# Patient Record
Sex: MALE | Race: WHITE | NOT HISPANIC OR LATINO | Employment: FULL TIME | URBAN - METROPOLITAN AREA
[De-identification: names, ages, dates, MRNs, and addresses within clinical notes are randomized per-mention and may not be internally consistent; named-entity substitution may affect disease eponyms.]

---

## 2023-06-04 ENCOUNTER — OFFICE VISIT (OUTPATIENT)
Dept: URGENT CARE | Facility: URGENT CARE | Age: 38
End: 2023-06-04
Payer: COMMERCIAL

## 2023-06-04 VITALS
RESPIRATION RATE: 18 BRPM | WEIGHT: 191 LBS | DIASTOLIC BLOOD PRESSURE: 70 MMHG | SYSTOLIC BLOOD PRESSURE: 117 MMHG | TEMPERATURE: 97.5 F | HEART RATE: 92 BPM | OXYGEN SATURATION: 96 %

## 2023-06-04 DIAGNOSIS — R07.0 THROAT PAIN: Primary | ICD-10-CM

## 2023-06-04 LAB
DEPRECATED S PYO AG THROAT QL EIA: NEGATIVE
GROUP A STREP BY PCR: NOT DETECTED

## 2023-06-04 PROCEDURE — 87651 STREP A DNA AMP PROBE: CPT

## 2023-06-04 PROCEDURE — 99203 OFFICE O/P NEW LOW 30 MIN: CPT

## 2023-06-04 RX ORDER — CARVEDILOL 3.12 MG/1
3.12 TABLET ORAL 2 TIMES DAILY WITH MEALS
COMMUNITY
Start: 2023-05-21

## 2023-06-04 RX ORDER — LIDOCAINE HYDROCHLORIDE 20 MG/ML
15 SOLUTION OROPHARYNGEAL EVERY 4 HOURS PRN
Qty: 100 ML | Refills: 0 | Status: SHIPPED | OUTPATIENT
Start: 2023-06-04 | End: 2023-06-11

## 2023-06-04 RX ORDER — ISOSORBIDE MONONITRATE 60 MG/1
1 TABLET, EXTENDED RELEASE ORAL
COMMUNITY
Start: 2022-12-22

## 2023-06-04 RX ORDER — INHALER,ASSIST DEV,SMALL MASK
SPACER (EA) MISCELLANEOUS
COMMUNITY
Start: 2023-04-27

## 2023-06-04 RX ORDER — PRASUGREL 10 MG/1
10 TABLET, FILM COATED ORAL EVERY MORNING
COMMUNITY
Start: 2023-05-28

## 2023-06-04 RX ORDER — CETIRIZINE HYDROCHLORIDE 10 MG/1
1 TABLET ORAL
COMMUNITY
Start: 2023-05-18

## 2023-06-04 RX ORDER — CEFUROXIME AXETIL 500 MG/1
1 TABLET ORAL
COMMUNITY
Start: 2023-05-24

## 2023-06-04 RX ORDER — OLMESARTAN MEDOXOMIL 20 MG/1
1 TABLET ORAL
COMMUNITY
Start: 2023-05-21

## 2023-06-04 RX ORDER — FLUTICASONE PROPIONATE 50 MCG
SPRAY, SUSPENSION (ML) NASAL
COMMUNITY
Start: 2022-10-03

## 2023-06-04 RX ORDER — PANTOPRAZOLE SODIUM 40 MG/1
TABLET, DELAYED RELEASE ORAL
COMMUNITY
Start: 2023-05-30

## 2023-06-04 RX ORDER — DILTIAZEM HYDROCHLORIDE 60 MG/1
TABLET, FILM COATED ORAL
COMMUNITY
Start: 2023-04-27

## 2023-06-04 RX ORDER — RANOLAZINE 1000 MG/1
1 TABLET, EXTENDED RELEASE ORAL
COMMUNITY
Start: 2023-05-17

## 2023-06-04 RX ORDER — ROSUVASTATIN CALCIUM 40 MG/1
40 TABLET, COATED ORAL EVERY EVENING
COMMUNITY
Start: 2023-05-21

## 2023-06-04 NOTE — PATIENT INSTRUCTIONS
Strep test is negative for strep throat.  Get plenty of rest and drink fluids.  Can use Tylenol as needed for pain.  Maximum dose of Tylenol is 4000mg in a 24 hour period of time.  You can also try warm salt water gargles, hot/warm water or tea with honey and/or lemon and/or Cepacol lozenges or spray for your sore throat.  You can also use the lidocaine solution as needed for the pain.

## 2023-06-04 NOTE — PROGRESS NOTES
ASSESSMENT:   (R07.0) Throat pain  (primary encounter diagnosis)  Plan: Streptococcus A Rapid Screen w/Reflex to PCR -         Clinic Collect, Group A Streptococcus PCR         Throat Swab, lidocaine, viscous, (XYLOCAINE) 2         % solution    PLAN:  Informed the patient of the strep test is negative for strep throat.  We discussed the need for him to get plenty of rest, drink fluids and use Tylenol as needed for pain with max dose being 4000 mg in 24 period time.  We also discussed trying warm salt gargles, hot/warm water or tea with honey under lemon and or Cepacol lozenges or spray for the sore throat.  As the patient indicated he has not had relief with Cepacol and gargling with warm salt water we discussed using lidocaine solution as well as needed for the pain.  Discussed the need to return to clinic with any new or worsening symptoms.  Patient acknowledged his understanding of the above plan.    The use of Dragon/AllDigital[employer] services may have been used to construct the content in this note; any grammatical or spelling errors are non-intentional. Please contact the author of this note directly if you are in need of any clarification.      Salvador Felix, BEN CNP      SUBJECTIVE:   Aston Babcock  is a 37 year old male who is here today because of: Sore Throat.  The patient has had symptoms of cough.   Onset of symptoms was 2 days ago. Course of illness is same.  Patient denies exposure to illness at home or work.   Patient denies fever, nasal congestion/runny nose, vomiting and diarrhea  Treatment measures tried include Cepacol lozenges and warm salt water gargles    ROS:  Negative except noted above.      OBJECTIVE:   /70   Pulse 92   Temp 97.5  F (36.4  C) (Tympanic)   Resp 18   Wt 86.6 kg (191 lb)   SpO2 96%   General: healthy, alert and no distress  Eyes - conjunctivae clear.  Ears - External ears normal. Canals clear. TM's normal.  Nose/Sinuses - Nares normal.Mucosa normal.  No drainage or sinus tenderness.  Oropharynx - Lips, mucosa, and tongue normal. Positive findings: oropharyngeal erythema, tonsillar hypertrophy  Neck - Neck supple; no lymphadenopathy  Lungs - Lungs clear; no wheezing or rales.  Heart - regular rate and rhythm. No murmurs, rub.    Labs:  Rapid Strep test is negative; await throat culture results.

## 2023-06-05 ENCOUNTER — TELEPHONE (OUTPATIENT)
Dept: URGENT CARE | Facility: URGENT CARE | Age: 38
End: 2023-06-05
Payer: COMMERCIAL

## 2023-06-05 NOTE — TELEPHONE ENCOUNTER
Patient's fiance/SO called to clinic on patient behalf, wanted to ask for a work excuse letter for missing work today and likely tomorrow.    Is out sick, was seen in  yesterday for sore throat. Notes he was advised to rest and to utilize OTC meds or prescribed viscous lidocaine for throat pain, PRN. Negative strep results.  Patient up here in [place] for work, originally from [place]. 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 Needs work excuse if not working. Asking to be excused for today and tomorrow, forgot to ask at time of visit.    Routing to  provider and care team to review and advise. Contact Juliana at [phone] with outcome.    Please route encounter back to  care team, please do NOT send back to writer. Thank you.      Anita Soni RN  Clinical Triage/Primary Care  [facility]

## 2023-06-07 NOTE — TELEPHONE ENCOUNTER
Called patient as their is no Consent to Communicate scanned in patients chart. Spoke to patient who had no idea of letter being requested asked me what the letter was for, informed it was an excuse letter for work.   Patient said ok he would come pick it up   Informed patient he is the only one that can  letter and he will need to show ID.  Dior Ramires, Lead MA